# Patient Record
Sex: MALE | Race: BLACK OR AFRICAN AMERICAN | Employment: FULL TIME | URBAN - METROPOLITAN AREA
[De-identification: names, ages, dates, MRNs, and addresses within clinical notes are randomized per-mention and may not be internally consistent; named-entity substitution may affect disease eponyms.]

---

## 2019-04-25 ENCOUNTER — HOSPITAL ENCOUNTER (EMERGENCY)
Age: 49
Discharge: HOME | End: 2019-04-25
Attending: EMERGENCY MEDICINE

## 2019-04-25 VITALS
HEART RATE: 76 BPM | TEMPERATURE: 98.3 F | RESPIRATION RATE: 18 BRPM | OXYGEN SATURATION: 97 % | BODY MASS INDEX: 32.51 KG/M2 | HEIGHT: 72 IN | WEIGHT: 240 LBS | DIASTOLIC BLOOD PRESSURE: 88 MMHG | SYSTOLIC BLOOD PRESSURE: 130 MMHG

## 2019-04-25 DIAGNOSIS — A08.4 VIRAL GASTROENTERITIS: Primary | ICD-10-CM

## 2019-04-25 PROCEDURE — 99283 EMERGENCY DEPT VISIT LOW MDM: CPT

## 2019-04-25 RX ORDER — ONDANSETRON 8 MG/1
8 TABLET, ORALLY DISINTEGRATING ORAL EVERY 8 HOURS PRN
Qty: 12 TABLET | Refills: 0 | Status: SHIPPED | OUTPATIENT
Start: 2019-04-25 | End: 2020-10-14

## 2019-04-25 ASSESSMENT — PAIN DESCRIPTION - DESCRIPTORS: DESCRIPTORS: ACHING

## 2019-04-25 ASSESSMENT — PAIN SCALES - GENERAL: PAINLEVEL_OUTOF10: 4

## 2019-04-25 ASSESSMENT — PAIN DESCRIPTION - LOCATION: LOCATION: GENERALIZED

## 2019-04-25 ASSESSMENT — PAIN DESCRIPTION - PAIN TYPE: TYPE: ACUTE PAIN

## 2019-04-25 NOTE — ED PROVIDER NOTES
HPI:  4/25/19,   Time: 10:54 AM         Conrad Diaz is a 57 y.o. male presenting to the ED for diarrhea that has been ongoing since Monday. He states that he has had some accompanying nausea, but has not had any vomiting. Patient states that he is not eating, but has been able to drink some Gatorade. He has not returned to work since Monday. He is at roughly 6 bouts of diarrhea in the last day, it is described as watery.  denies any fevers, although he does complain of having some chills occasionally. Patient denies any chest pain, shortness of breath. Patient did have a sick contact at work, who has similar symptoms. ROS:   Pertinent positives and negatives are stated within HPI, all other systems reviewed and are negative.  --------------------------------------------- PAST HISTORY ---------------------------------------------  Past Medical History:  has no past medical history on file. Past Surgical History:  has a past surgical history that includes Cholecystectomy and fracture surgery. Social History:  reports that he has never smoked. He has never used smokeless tobacco. He reports that he does not drink alcohol or use drugs. Family History: family history is not on file. The patients home medications have been reviewed. Allergies: Sulfa antibiotics    -------------------------------------------------- RESULTS -------------------------------------------------  All laboratory and radiology results have been personally reviewed by myself   LABS:  No results found for this visit on 04/25/19. RADIOLOGY:  Interpreted by Radiologist.  No orders to display       ------------------------- NURSING NOTES AND VITALS REVIEWED ---------------------------   The nursing notes within the ED encounter and vital signs as below have been reviewed.    /88   Pulse 76   Temp 98.3 °F (36.8 °C)   Resp 18   Ht 6' (1.829 m)   Wt 240 lb (108.9 kg)   SpO2 97%   BMI 32.55 kg/m² Oxygen Saturation Interpretation: Normal      ---------------------------------------------------PHYSICAL EXAM--------------------------------------      Constitutional/General: Alert and oriented x3, well appearing, non toxic in NAD  Head: NC/AT  Eyes: PERRL, EOMI  Mouth: Oropharynx clear, handling secretions, no trismus  Neck: Supple, full ROM, no meningeal signs  Abdomen: Soft, non tender, non distended, normal bowel sounds  Back:  No tenderness to palpation on the cervical or thoracic or lumbar spine  Extremities: Moves all extremities x 4. Warm and well perfused  Skin: warm and dry without rash  Neurologic: GCS 15, no focal acute neurological deficit  Psych: Normal Affect      ------------------------------ ED COURSE/MEDICAL DECISION MAKING----------------------  Medications - No data to display         Medical Decision Making:    Patient presents to the [facility] emergency Department today with a case of viral gastroenteritis. He'll be given Zofran 8 mg to take 3 times a day when necessary for nausea. This should allow him to increase his solid food intake, which should allow for better formation of bowel movements. Patient was recommended to follow a bland diet. Patient advised to return to the emergency department should symptoms worsen. Advised to contact primary care physician to secure follow-up appointment within the next 1-2 days. --------------------------------- IMPRESSION AND DISPOSITION ---------------------------------    IMPRESSION  1.  Viral gastroenteritis        DISPOSITION  Disposition: Discharge to home  Patient condition is good          DO Fariha Green  04/25/19 1057

## 2019-04-25 NOTE — LETTER
[facility] [address] [place] [address] Emergency Department  [address] [facility] [address]  Phone: [phone]               April 25, 2019    Patient: Joeline Carrel   YOB: 1970   Date of Visit: 4/25/2019       To Whom It May Concern:    Jazmine Neal was seen and treated in our emergency department on 4/25/2019. He may return to work on 4/29/2019.       Sincerely,       Eddie Ordonez DO         Signature:__________________________________

## 2020-10-14 ENCOUNTER — HOSPITAL ENCOUNTER (EMERGENCY)
Age: 50
Discharge: HOME | End: 2020-10-14
Attending: STUDENT IN AN ORGANIZED HEALTH CARE EDUCATION/TRAINING PROGRAM
Payer: COMMERCIAL

## 2020-10-14 VITALS
HEIGHT: 72 IN | HEART RATE: 99 BPM | WEIGHT: 250 LBS | SYSTOLIC BLOOD PRESSURE: 145 MMHG | DIASTOLIC BLOOD PRESSURE: 95 MMHG | TEMPERATURE: 98 F | RESPIRATION RATE: 16 BRPM | OXYGEN SATURATION: 99 % | BODY MASS INDEX: 33.86 KG/M2

## 2020-10-14 LAB
BACTERIA: ABNORMAL /HPF
BILIRUBIN URINE: NEGATIVE
BLOOD, URINE: NEGATIVE
CLARITY: CLEAR
COLOR: YELLOW
EPITHELIAL CELLS, UA: ABNORMAL /HPF
GLUCOSE URINE: NEGATIVE MG/DL
KETONES, URINE: NEGATIVE MG/DL
LEUKOCYTE ESTERASE, URINE: NEGATIVE
MUCUS: PRESENT /LPF
NITRITE, URINE: NEGATIVE
PH UA: 5.5 (ref 5–9)
PROTEIN UA: NEGATIVE MG/DL
RBC UA: ABNORMAL /HPF (ref 0–2)
SPECIFIC GRAVITY UA: >=1.03 (ref 1–1.03)
UROBILINOGEN, URINE: 0.2 E.U./DL
WBC UA: ABNORMAL /HPF (ref 0–5)

## 2020-10-14 PROCEDURE — 87591 N.GONORRHOEAE DNA AMP PROB: CPT

## 2020-10-14 PROCEDURE — 81001 URINALYSIS AUTO W/SCOPE: CPT

## 2020-10-14 PROCEDURE — 87491 CHLMYD TRACH DNA AMP PROBE: CPT

## 2020-10-14 PROCEDURE — G0382 LEV 3 HOSP TYPE B ED VISIT: HCPCS

## 2020-10-14 RX ORDER — METRONIDAZOLE 500 MG/1
500 TABLET ORAL 2 TIMES DAILY
Qty: 14 TABLET | Refills: 0 | Status: SHIPPED | OUTPATIENT
Start: 2020-10-14 | End: 2020-10-21

## 2020-10-14 NOTE — ED PROVIDER NOTES
HPI:  10/14/20,   Time: 8:36 AM EDT         Ethan Hedrick is a 52 y.o. male presenting to the ED for exposure to Trichomonas. Pt reports his sexual partner had a recent gynecology exam and was noted to have trichomonas. This is the patient's only sexual partner. Pt was instructed to be evaluated for trichomonas. He denies any symptoms including penile discharge, dysuria, hematuria, abdominal pain. Does have history of chlamydia. Denies recent fever, chills, nausea, vomiting. ROS:   Pertinent positives and negatives are stated within HPI, all other systems reviewed and are negative.  --------------------------------------------- PAST HISTORY ---------------------------------------------  Past Medical History:  has no past medical history on file. Past Surgical History: Cholecystectomy    Social History:  reports that he has never smoked. He has never used smokeless tobacco. He reports that he does not drink alcohol or use drugs. Family History: family history is not on file. The patients home medications have been reviewed.     Allergies: Sulfa antibiotics    -------------------------------------------------- RESULTS -------------------------------------------------  All laboratory and radiology results have been personally reviewed by myself   LABS:  Results for orders placed or performed during the hospital encounter of 10/14/20   C.trachomatis N.gonorrhoeae DNA, Urine    Specimen: Urine   Result Value Ref Range    Source Urine    Urinalysis with Microscopic   Result Value Ref Range    Color, UA Yellow Straw/Yellow    Clarity, UA Clear Clear    Glucose, Ur Negative Negative mg/dL    Bilirubin Urine Negative Negative    Ketones, Urine Negative Negative mg/dL    Specific Gravity, UA >=1.030 1.005 - 1.030    Blood, Urine Negative Negative    pH, UA 5.5 5.0 - 9.0    Protein, UA Negative Negative mg/dL    Urobilinogen, Urine 0.2 <2.0 E.U./dL    Nitrite, Urine Negative Negative    Leukocyte Esterase, Urine Negative Negative    Mucus, UA Present (A) None Seen /LPF    WBC, UA 2-5 0 - 5 /HPF    RBC, UA 1-3 0 - 2 /HPF    Epithelial Cells, UA RARE /HPF    Bacteria, UA FEW (A) None Seen /HPF       RADIOLOGY:  Interpreted by Radiologist.  No orders to display       ------------------------- NURSING NOTES AND VITALS REVIEWED ---------------------------   The nursing notes within the ED encounter and vital signs as below have been reviewed. BP (!) 145/95   Pulse 99   Temp 98 °F (36.7 °C) (Oral)   Resp 16   Ht 6' (1.829 m)   Wt 250 lb (113.4 kg)   SpO2 99%   BMI 33.91 kg/m²   Oxygen Saturation Interpretation: Normal      ---------------------------------------------------PHYSICAL EXAM--------------------------------------    Constitutional/General: Alert and oriented x3, well appearing, non toxic in NAD  Head: NC/AT  Eyes: PERRL, EOMI  Mouth: Oropharynx clear, handling secretions, no trismus  Neck: Supple, full ROM, no meningeal signs  Pulmonary: Lungs clear to auscultation bilaterally, no wheezes, rales, or rhonchi. Not in respiratory distress  Cardiovascular:  Regular rate and rhythm, no murmurs, gallops, or rubs. 2+ distal pulses  Abdomen: Soft, non tender, non distended,   Extremities: Moves all extremities x 4. Warm and well perfused  Skin: warm and dry without rash  Neurologic: GCS 15,  Psych: Normal Affect      ------------------------------ ED COURSE/MEDICAL DECISION MAKING----------------------  Medications - No data to display    Medical Decision Makin-year-old male with exposure to trichomonas. He currently has no symptoms. Vitals reviewed and grossly within normal limits. Exam nonfocal.  We will text a urinalysis in order trichomonas screen her list would not be received today. Plan to treat the patient with 7 days of Flagyl. Patient was discharged and was given the referral line for follow-up care.     Patient's Medications   New Prescriptions    METRONIDAZOLE (FLAGYL) 500 MG TABLET Take 1 tablet by mouth 2 times daily for 7 days   Previous Medications    No medications on file   Modified Medications    No medications on file   Discontinued Medications    ONDANSETRON (ZOFRAN ODT) 8 MG TBDP DISINTEGRATING TABLET    Take 1 tablet by mouth every 8 hours as needed for Nausea         Counseling: The emergency provider has spoken with the patient and discussed todays results, in addition to providing specific details for the plan of care and counseling regarding the diagnosis and prognosis. Questions are answered at this time and they are agreeable with the plan.      --------------------------------- IMPRESSION AND DISPOSITION ---------------------------------    IMPRESSION  1.  Exposure to trichomonas        DISPOSITION  Disposition: Discharge to home  Patient condition is good                  Tyson Steward DO  10/14/20 9817

## 2020-10-19 LAB
C. TRACHOMATIS DNA ,URINE: NEGATIVE
N. GONORRHOEAE DNA, URINE: NEGATIVE
SOURCE: NORMAL

## 2021-05-04 ENCOUNTER — HOSPITAL ENCOUNTER (EMERGENCY)
Age: 51
Discharge: HOME | End: 2021-05-04
Attending: EMERGENCY MEDICINE

## 2021-05-04 VITALS
TEMPERATURE: 99 F | BODY MASS INDEX: 36.35 KG/M2 | DIASTOLIC BLOOD PRESSURE: 71 MMHG | OXYGEN SATURATION: 99 % | RESPIRATION RATE: 18 BRPM | SYSTOLIC BLOOD PRESSURE: 129 MMHG | HEART RATE: 64 BPM | WEIGHT: 268 LBS

## 2021-05-04 DIAGNOSIS — R19.7 DIARRHEA, UNSPECIFIED TYPE: Primary | ICD-10-CM

## 2021-05-04 DIAGNOSIS — Z20.2 EXPOSURE TO TRICHOMONAS: ICD-10-CM

## 2021-05-04 LAB
BACTERIA: ABNORMAL /HPF
BILIRUBIN URINE: NEGATIVE
BLOOD, URINE: NEGATIVE
CLARITY: CLEAR
COLOR: YELLOW
EPITHELIAL CELLS, UA: ABNORMAL /HPF
GLUCOSE URINE: NEGATIVE MG/DL
KETONES, URINE: NEGATIVE MG/DL
LEUKOCYTE ESTERASE, URINE: NEGATIVE
NITRITE, URINE: NEGATIVE
PH UA: 5.5 (ref 5–9)
PROTEIN UA: NEGATIVE MG/DL
RBC UA: ABNORMAL /HPF (ref 0–2)
SPECIFIC GRAVITY UA: 1.02 (ref 1–1.03)
UROBILINOGEN, URINE: 0.2 E.U./DL
WBC UA: ABNORMAL /HPF (ref 0–5)

## 2021-05-04 PROCEDURE — 99283 EMERGENCY DEPT VISIT LOW MDM: CPT

## 2021-05-04 PROCEDURE — 81001 URINALYSIS AUTO W/SCOPE: CPT

## 2021-05-04 RX ORDER — METRONIDAZOLE 500 MG/1
2000 TABLET ORAL ONCE
Qty: 4 TABLET | Refills: 0 | Status: SHIPPED | OUTPATIENT
Start: 2021-05-04 | End: 2021-05-04

## 2021-05-04 NOTE — ED PROVIDER NOTES
HPI:  5/4/21,   Time: 10:05 AM EDT         Priya Collins is a 52 y.o. male presenting to the ED for diarrhea x 4 days that is improving, denies nausea/vomiting/CP/SOB. Also claims to have been exposed to Trichomoniasis. ROS:   Pertinent positives and negatives are stated within HPI, all other systems reviewed and are negative.  --------------------------------------------- PAST HISTORY ---------------------------------------------  Past Medical History:  has no past medical history on file. Past Surgical History:  has a past surgical history that includes Cholecystectomy and fracture surgery. Social History:  reports that he has never smoked. He has never used smokeless tobacco. He reports that he does not drink alcohol or use drugs. Family History: family history is not on file. The patients home medications have been reviewed.     Allergies: Sulfa antibiotics    -------------------------------------------------- RESULTS -------------------------------------------------  All laboratory and radiology results have been personally reviewed by myself   LABS:  Results for orders placed or performed during the hospital encounter of 05/04/21   Urinalysis   Result Value Ref Range    Color, UA Yellow Straw/Yellow    Clarity, UA Clear Clear    Glucose, Ur Negative Negative mg/dL    Bilirubin Urine Negative Negative    Ketones, Urine Negative Negative mg/dL    Specific Gravity, UA 1.020 1.005 - 1.030    Blood, Urine Negative Negative    pH, UA 5.5 5.0 - 9.0    Protein, UA Negative Negative mg/dL    Urobilinogen, Urine 0.2 <2.0 E.U./dL    Nitrite, Urine Negative Negative    Leukocyte Esterase, Urine Negative Negative   Microscopic Urinalysis   Result Value Ref Range    WBC, UA 0-1 0 - 5 /HPF    RBC, UA 0-1 0 - 2 /HPF    Epithelial Cells, UA RARE /HPF    Bacteria, UA RARE (A) None Seen /HPF       RADIOLOGY:  Interpreted by Radiologist.  No orders to display       ------------------------- NURSING NOTES AND

## 2022-01-11 ENCOUNTER — HOSPITAL ENCOUNTER (EMERGENCY)
Age: 52
Discharge: HOME | End: 2022-01-11
Attending: EMERGENCY MEDICINE

## 2022-01-11 VITALS
OXYGEN SATURATION: 99 % | DIASTOLIC BLOOD PRESSURE: 82 MMHG | HEIGHT: 72 IN | RESPIRATION RATE: 18 BRPM | TEMPERATURE: 98.2 F | HEART RATE: 88 BPM | WEIGHT: 230 LBS | SYSTOLIC BLOOD PRESSURE: 143 MMHG | BODY MASS INDEX: 31.15 KG/M2

## 2022-01-11 DIAGNOSIS — Z20.822 SUSPECTED COVID-19 VIRUS INFECTION: Primary | ICD-10-CM

## 2022-01-11 PROCEDURE — 99283 EMERGENCY DEPT VISIT LOW MDM: CPT

## 2022-01-11 PROCEDURE — U0005 INFEC AGEN DETEC AMPLI PROBE: HCPCS

## 2022-01-11 PROCEDURE — U0003 INFECTIOUS AGENT DETECTION BY NUCLEIC ACID (DNA OR RNA); SEVERE ACUTE RESPIRATORY SYNDROME CORONAVIRUS 2 (SARS-COV-2) (CORONAVIRUS DISEASE [COVID-19]), AMPLIFIED PROBE TECHNIQUE, MAKING USE OF HIGH THROUGHPUT TECHNOLOGIES AS DESCRIBED BY CMS-2020-01-R: HCPCS

## 2022-01-11 RX ORDER — BROMPHENIRAMINE MALEATE, PSEUDOEPHEDRINE HYDROCHLORIDE, AND DEXTROMETHORPHAN HYDROBROMIDE 2; 30; 10 MG/5ML; MG/5ML; MG/5ML
5 SYRUP ORAL 4 TIMES DAILY PRN
Qty: 120 ML | Refills: 0 | Status: SHIPPED | OUTPATIENT
Start: 2022-01-11 | End: 2022-01-21

## 2022-01-11 RX ORDER — BENZONATATE 200 MG/1
200 CAPSULE ORAL 3 TIMES DAILY PRN
Qty: 30 CAPSULE | Refills: 0 | Status: SHIPPED | OUTPATIENT
Start: 2022-01-11 | End: 2022-01-18

## 2022-01-11 RX ORDER — ALBUTEROL SULFATE 90 UG/1
2 AEROSOL, METERED RESPIRATORY (INHALATION) 4 TIMES DAILY PRN
Qty: 18 G | Refills: 0 | Status: SHIPPED | OUTPATIENT
Start: 2022-01-11 | End: 2022-09-08

## 2022-01-11 ASSESSMENT — PAIN - FUNCTIONAL ASSESSMENT: PAIN_FUNCTIONAL_ASSESSMENT: 0-10

## 2022-01-11 ASSESSMENT — ENCOUNTER SYMPTOMS
EYE DISCHARGE: 0
EYE REDNESS: 0
SINUS PRESSURE: 0
COUGH: 1
WHEEZING: 0
DIARRHEA: 0
ABDOMINAL PAIN: 0
SHORTNESS OF BREATH: 0
EYE PAIN: 0
VOMITING: 0
SORE THROAT: 0
BACK PAIN: 0
NAUSEA: 0

## 2022-01-11 ASSESSMENT — PAIN DESCRIPTION - DESCRIPTORS: DESCRIPTORS: ACHING

## 2022-01-11 ASSESSMENT — PAIN DESCRIPTION - PROGRESSION: CLINICAL_PROGRESSION: NOT CHANGED

## 2022-01-11 ASSESSMENT — PAIN DESCRIPTION - PAIN TYPE: TYPE: ACUTE PAIN

## 2022-01-11 ASSESSMENT — PAIN SCALES - GENERAL
PAINLEVEL_OUTOF10: 4
PAINLEVEL_OUTOF10: 4

## 2022-01-11 ASSESSMENT — PAIN DESCRIPTION - FREQUENCY: FREQUENCY: CONTINUOUS

## 2022-01-11 ASSESSMENT — PAIN DESCRIPTION - LOCATION: LOCATION: GENERALIZED

## 2022-01-11 ASSESSMENT — PAIN DESCRIPTION - ONSET: ONSET: ON-GOING

## 2022-01-11 NOTE — Clinical Note
Braden Coronado was seen and treated in our emergency department on 1/11/2022. He may return to work on 01/15/2022. REMAIN IN ISOLATION (Pending results):  NEGATIVE result:                                                                                                                                                                                     (result in 2-3 days; return to work/school if negative)       OR     POSITIVE result:  \" 5 days from date symptoms first appeared, mask for 10 days  \" at least 24 hours with no fever (without the use of fever-reducing medication)   \" AND symptoms improved         If you have any questions or concerns, please don't hesitate to call.       Melida Hussein, DO

## 2022-01-11 NOTE — ED PROVIDER NOTES
The history is provided by the patient. Illness   The current episode started 3 to 5 days ago. The onset was gradual. The problem occurs occasionally. The problem has been unchanged. The problem is mild. Nothing relieves the symptoms. Nothing aggravates the symptoms. Associated symptoms include congestion, cough and URI. Pertinent negatives include no fever, no abdominal pain, no diarrhea, no nausea, no vomiting, no ear pain, no headaches, no sore throat, no wheezing, no rash, no eye discharge, no eye pain and no eye redness. He has been less active. He has been eating less than usual. Urine output has been normal. The last void occurred less than 6 hours ago. There were sick contacts at home. He has received no recent medical care. Review of Systems   Constitutional: Positive for activity change, appetite change, chills and fatigue. Negative for fever. HENT: Positive for congestion. Negative for ear pain, sinus pressure and sore throat. Eyes: Negative for pain, discharge and redness. Respiratory: Positive for cough. Negative for shortness of breath and wheezing. Cardiovascular: Negative for chest pain. Gastrointestinal: Negative for abdominal pain, diarrhea, nausea and vomiting. Genitourinary: Negative for dysuria and frequency. Musculoskeletal: Positive for myalgias. Negative for arthralgias and back pain. Skin: Negative for rash and wound. Neurological: Negative for weakness and headaches. Hematological: Negative for adenopathy. Psychiatric/Behavioral: Negative. All other systems reviewed and are negative. Physical Exam  Vitals and nursing note reviewed. Constitutional:       Appearance: He is well-developed. HENT:      Head: Normocephalic and atraumatic. Right Ear: Tympanic membrane normal.      Left Ear: Tympanic membrane normal.      Nose: Congestion and rhinorrhea present. Mouth/Throat:      Pharynx: Posterior oropharyngeal erythema present.    Eyes: Pupils: Pupils are equal, round, and reactive to light. Cardiovascular:      Rate and Rhythm: Normal rate and regular rhythm. Heart sounds: Normal heart sounds. No murmur heard. Pulmonary:      Effort: Pulmonary effort is normal.      Breath sounds: Wheezing present. Abdominal:      General: Bowel sounds are normal.      Palpations: Abdomen is soft. Tenderness: There is no abdominal tenderness. There is no guarding or rebound. Musculoskeletal:      Cervical back: Normal range of motion and neck supple. Skin:     General: Skin is warm and dry. Neurological:      Mental Status: He is alert and oriented to person, place, and time. Psychiatric:         Behavior: Behavior normal.         Thought Content: Thought content normal.         Judgment: Judgment normal.        --------------------------------------------- PAST HISTORY ---------------------------------------------  Past Medical History:  has no past medical history on file. Past Surgical History:  has a past surgical history that includes Cholecystectomy and fracture surgery. Social History:  reports that he has never smoked. He has never used smokeless tobacco. He reports that he does not drink alcohol and does not use drugs. Family History: family history is not on file. The patients home medications have been reviewed. Allergies: Sulfa antibiotics    -------------------------------------------------- RESULTS -------------------------------------------------  No results found for this visit on 01/11/22. No orders to display       ------------------------- NURSING NOTES AND VITALS REVIEWED ---------------------------   The nursing notes within the ED encounter and vital signs as below have been reviewed.    BP (!) 143/82   Pulse 88   Temp 98.2 °F (36.8 °C) (Temporal)   Resp 18   Ht 6' (1.829 m)   Wt 230 lb (104.3 kg)   SpO2 99%   BMI 31.19 kg/m²   Oxygen Saturation Interpretation:

## 2022-01-12 ENCOUNTER — CARE COORDINATION (OUTPATIENT)
Dept: CARE COORDINATION | Age: 52
End: 2022-01-12

## 2022-01-12 LAB — SARS-COV-2, PCR: DETECTED

## 2022-01-12 NOTE — CARE COORDINATION
Patient contacted regarding COVID-19 diagnosis. Discussed COVID-19 related testing which was available at this time. Test results were positive. Patient informed of results, if available? Yes. Ambulatory Care Manager contacted the patient by telephone to perform post discharge assessment. Call within 2 business days of discharge: Yes. Verified name and  with patient as identifiers. Provided introduction to self, and explanation of the CTN/ACM role, and reason for call due to risk factors for infection and/or exposure to COVID-19. Symptoms reviewed with patient who verbalized the following symptoms: fatigue, cough, sweating, no new symptoms, no worsening symptoms and decreased appetite. Due to no new or worsening symptoms encounter was not routed to provider for escalation. Discussed follow-up appointments. If no appointment was previously scheduled, appointment scheduling offered: Yes. [facility] follow up appointment(s): No future appointments. Referral made for Mitchell to be contacted to establish with a [facility] PCP. Non-[facility] follow up appointment(s): NO PCP    Non-face-to-face services provided:  Obtained and reviewed discharge summary and/or continuity of care documents     Advance Care Planning:   Does patient have an Advance Directive:  decision maker updated. Educated patient about risk for severe COVID-19 due to risk factors according to CDC guidelines. ACM reviewed discharge instructions, medical action plan and red flag symptoms with the patient who verbalized understanding. Discussed COVID vaccination status: Yes. Education provided on COVID-19 vaccination as appropriate. Discussed exposure protocols and quarantine with CDC Guidelines. Patient was given an opportunity to verbalize any questions and concerns and agrees to contact ACM or health care provider for questions related to their healthcare.     Reviewed and educated patient on any new and changed medications related to discharge diagnosis     Was patient discharged with a pulse oximeter? Hortencia Medrano denies any fever or shortness of breath. He reports he is taking the benzonatate, albuterol inhaler and brompheniraminepseudoephedrine-DM as prescribed. He is active with MyChart. ACM provided contact information. No further follow-up call identified based on severity of symptoms and risk factors.

## 2022-01-12 NOTE — CARE COORDINATION
Date/Time:  1/12/2022 4:02 PM  Attempted to reach patient by telephone. Left HIPAA compliant message requesting a return call. Will attempt to reach patient again.

## 2022-09-08 ENCOUNTER — OFFICE VISIT (OUTPATIENT)
Dept: FAMILY MEDICINE CLINIC | Age: 52
End: 2022-09-08
Payer: MEDICAID

## 2022-09-08 VITALS
OXYGEN SATURATION: 97 % | BODY MASS INDEX: 34.81 KG/M2 | HEART RATE: 71 BPM | TEMPERATURE: 98.2 F | RESPIRATION RATE: 16 BRPM | WEIGHT: 257 LBS | DIASTOLIC BLOOD PRESSURE: 72 MMHG | HEIGHT: 72 IN | SYSTOLIC BLOOD PRESSURE: 116 MMHG

## 2022-09-08 DIAGNOSIS — Z12.11 SCREEN FOR COLON CANCER: ICD-10-CM

## 2022-09-08 DIAGNOSIS — K21.9 GASTROESOPHAGEAL REFLUX DISEASE, UNSPECIFIED WHETHER ESOPHAGITIS PRESENT: Primary | ICD-10-CM

## 2022-09-08 DIAGNOSIS — Z13.220 SCREENING, LIPID: ICD-10-CM

## 2022-09-08 DIAGNOSIS — Z13.1 DIABETES MELLITUS SCREENING: ICD-10-CM

## 2022-09-08 LAB — HBA1C MFR BLD: 5.8 %

## 2022-09-08 PROCEDURE — 83036 HEMOGLOBIN GLYCOSYLATED A1C: CPT | Performed by: FAMILY MEDICINE

## 2022-09-08 PROCEDURE — 99204 OFFICE O/P NEW MOD 45 MIN: CPT | Performed by: FAMILY MEDICINE

## 2022-09-08 PROCEDURE — 3017F COLORECTAL CA SCREEN DOC REV: CPT | Performed by: FAMILY MEDICINE

## 2022-09-08 PROCEDURE — G8417 CALC BMI ABV UP PARAM F/U: HCPCS | Performed by: FAMILY MEDICINE

## 2022-09-08 PROCEDURE — G8427 DOCREV CUR MEDS BY ELIG CLIN: HCPCS | Performed by: FAMILY MEDICINE

## 2022-09-08 PROCEDURE — 1036F TOBACCO NON-USER: CPT | Performed by: FAMILY MEDICINE

## 2022-09-08 RX ORDER — PANTOPRAZOLE SODIUM 40 MG/1
40 TABLET, DELAYED RELEASE ORAL DAILY
Qty: 30 TABLET | Refills: 1 | Status: SHIPPED
Start: 2022-09-08 | End: 2022-09-16

## 2022-09-08 SDOH — ECONOMIC STABILITY: FOOD INSECURITY: WITHIN THE PAST 12 MONTHS, YOU WORRIED THAT YOUR FOOD WOULD RUN OUT BEFORE YOU GOT MONEY TO BUY MORE.: NEVER TRUE

## 2022-09-08 SDOH — ECONOMIC STABILITY: FOOD INSECURITY: WITHIN THE PAST 12 MONTHS, THE FOOD YOU BOUGHT JUST DIDN'T LAST AND YOU DIDN'T HAVE MONEY TO GET MORE.: NEVER TRUE

## 2022-09-08 ASSESSMENT — PATIENT HEALTH QUESTIONNAIRE - PHQ9
2. FEELING DOWN, DEPRESSED OR HOPELESS: 0
SUM OF ALL RESPONSES TO PHQ QUESTIONS 1-9: 0
SUM OF ALL RESPONSES TO PHQ9 QUESTIONS 1 & 2: 0
SUM OF ALL RESPONSES TO PHQ QUESTIONS 1-9: 0
1. LITTLE INTEREST OR PLEASURE IN DOING THINGS: 0

## 2022-09-08 ASSESSMENT — SOCIAL DETERMINANTS OF HEALTH (SDOH): HOW HARD IS IT FOR YOU TO PAY FOR THE VERY BASICS LIKE FOOD, HOUSING, MEDICAL CARE, AND HEATING?: NOT HARD AT ALL

## 2022-09-08 ASSESSMENT — LIFESTYLE VARIABLES
HOW OFTEN DO YOU HAVE A DRINK CONTAINING ALCOHOL: NEVER
HOW MANY STANDARD DRINKS CONTAINING ALCOHOL DO YOU HAVE ON A TYPICAL DAY: PATIENT DOES NOT DRINK

## 2022-09-08 NOTE — PROGRESS NOTES
2022    Xin Sandoval    Chief Complaint   Patient presents with    Establish Care     Patient here to establish. Patient just turned 50 and wants a physical.     Heartburn     Has been experiencing heartburn recently. Would like this checked out. HPI  History was obtained from patient. Cyn Braswell is a 50 y.o. male who presents today with the followin. Diabetes mellitus screening          REVIEW OF SYMPTOMS    Review of Systems    PAST MEDICAL HISTORY  No past medical history on file. FAMILY HISTORY  No family history on file. SOCIAL HISTORY  Social History     Socioeconomic History    Marital status:      Spouse name: None    Number of children: None    Years of education: None    Highest education level: None   Tobacco Use    Smoking status: Never    Smokeless tobacco: Never   Substance and Sexual Activity    Alcohol use: No    Drug use: Never     Social Determinants of Health     Financial Resource Strain: Low Risk     Difficulty of Paying Living Expenses: Not hard at all   Food Insecurity: No Food Insecurity    Worried About Running Out of Food in the Last Year: Never true    Ran Out of Food in the Last Year: Never true        SURGICAL HISTORY  Past Surgical History:   Procedure Laterality Date    CHOLECYSTECTOMY      FRACTURE SURGERY                   CURRENT MEDICATIONS  No current outpatient medications on file. No current facility-administered medications for this visit. ALLERGIES  Allergies   Allergen Reactions    Sulfa Antibiotics Rash       PHYSICAL EXAM    /72   Pulse 71   Temp 98.2 °F (36.8 °C)   Resp 16   Ht 6' (1.829 m)   Wt 257 lb (116.6 kg)   SpO2 97%   BMI 34.86 kg/m²     Physical Exam    ASSESSMENT & PLAN    Cyn Braswell was seen today for establish care and heartburn. Diagnoses and all orders for this visit:    Diabetes mellitus screening  -     POCT glycosylated hemoglobin (Hb A1C)        No follow-ups on file.          Electronically signed by Anni Mcfarland.  Sb Patel, DO on 9/8/2022

## 2022-09-10 ENCOUNTER — HOSPITAL ENCOUNTER (OUTPATIENT)
Age: 52
Discharge: HOME | End: 2022-09-10
Payer: MEDICAID

## 2022-09-10 DIAGNOSIS — Z13.220 SCREENING, LIPID: ICD-10-CM

## 2022-09-10 DIAGNOSIS — Z13.1 DIABETES MELLITUS SCREENING: ICD-10-CM

## 2022-09-10 LAB
ALBUMIN SERPL-MCNC: 4.5 G/DL (ref 3.5–5.2)
ALP BLD-CCNC: 51 U/L (ref 40–129)
ALT SERPL-CCNC: 13 U/L (ref 0–40)
ANION GAP SERPL CALCULATED.3IONS-SCNC: 12 MMOL/L (ref 7–16)
AST SERPL-CCNC: 20 U/L (ref 0–39)
BILIRUB SERPL-MCNC: 0.4 MG/DL (ref 0–1.2)
BUN BLDV-MCNC: 13 MG/DL (ref 6–20)
CALCIUM SERPL-MCNC: 9.2 MG/DL (ref 8.6–10.2)
CHLORIDE BLD-SCNC: 102 MMOL/L (ref 98–107)
CHOLESTEROL, TOTAL: 178 MG/DL (ref 0–199)
CO2: 24 MMOL/L (ref 22–29)
CREAT SERPL-MCNC: 1 MG/DL (ref 0.7–1.2)
GFR AFRICAN AMERICAN: >60
GFR NON-AFRICAN AMERICAN: >60 ML/MIN/1.73
GLUCOSE BLD-MCNC: 114 MG/DL (ref 74–99)
HDLC SERPL-MCNC: 44 MG/DL
LDL CHOLESTEROL CALCULATED: 115 MG/DL (ref 0–99)
POTASSIUM SERPL-SCNC: 4.1 MMOL/L (ref 3.5–5)
SODIUM BLD-SCNC: 138 MMOL/L (ref 132–146)
TOTAL PROTEIN: 7.4 G/DL (ref 6.4–8.3)
TRIGL SERPL-MCNC: 94 MG/DL (ref 0–149)
VLDLC SERPL CALC-MCNC: 19 MG/DL

## 2022-09-10 PROCEDURE — 80061 LIPID PANEL: CPT

## 2022-09-10 PROCEDURE — 80053 COMPREHEN METABOLIC PANEL: CPT

## 2022-09-10 PROCEDURE — 36415 COLL VENOUS BLD VENIPUNCTURE: CPT

## 2022-09-11 ASSESSMENT — ENCOUNTER SYMPTOMS
COUGH: 0
EYE DISCHARGE: 0
SINUS PAIN: 0
SHORTNESS OF BREATH: 0
RHINORRHEA: 0
PHOTOPHOBIA: 0
EYE REDNESS: 0
ABDOMINAL PAIN: 1

## 2022-09-11 NOTE — PROGRESS NOTES
2022    Indira Rodriguez    Chief Complaint   Patient presents with    [facility] Care     Patient here to establish. Patient just turned 50 and wants a physical.     Heartburn     Has been experiencing heartburn recently. Would like this checked out. HPI  History was obtained from patient. Dano Rodriguez is a 50 y.o. male who presents today with the followin. Gastroesophageal reflux disease, unspecified whether esophagitis present    2. Diabetes mellitus screening    3. Screening, lipid    4. Screen for colon cancer    Patient presents to [facility]. Patient is not currently taking any prescription medication. Patient does have a complaint of epigastric pain. He states he has this on a daily basis and wants to have it checked out. Patient states that the pain is not changed by eating. He is not currently on any medication for this. He denies any constipation or diarrhea. REVIEW OF SYMPTOMS    Review of Systems   Constitutional:  Negative for chills, fatigue and fever. HENT:  Negative for congestion, mouth sores, postnasal drip, rhinorrhea and sinus pain. Eyes:  Negative for photophobia, discharge and redness. Respiratory:  Negative for cough and shortness of breath. Cardiovascular:  Negative for chest pain. Gastrointestinal:  Positive for abdominal pain (Epigastric). Genitourinary:  Negative for difficulty urinating, dysuria, hematuria and urgency. Neurological:  Negative for headaches. Psychiatric/Behavioral:  Negative for sleep disturbance. PAST MEDICAL HISTORY  History reviewed. No pertinent past medical history. FAMILY HISTORY  Family History   Problem Relation Age of Onset    Diabetes Mother     Heart Failure Mother     Diabetes Father         kidney transplant       SOCIAL HISTORY  Social History     Socioeconomic History    Marital status:       Spouse name: None    Number of children: None    Years of education: None    Highest education level: None   Tobacco Use    Smoking status: Never    Smokeless tobacco: Never   Substance and Sexual Activity    Alcohol use: No    Drug use: Never     Social Determinants of Health     Financial Resource Strain: Low Risk     Difficulty of Paying Living Expenses: Not hard at all   Food Insecurity: No Food Insecurity    Worried About [facility] in the Last Year: Never true    [facility] in the Last Year: Never true        SURGICAL HISTORY  Past Surgical History:   Procedure Laterality Date    CHOLECYSTECTOMY  2000    FRACTURE SURGERY                   CURRENT MEDICATIONS  Current Outpatient Medications   Medication Sig Dispense Refill    pantoprazole (PROTONIX) 40 MG tablet Take 1 tablet by mouth daily 30 tablet 1     No current facility-administered medications for this visit. ALLERGIES  Allergies   Allergen Reactions    Sulfa Antibiotics Rash       PHYSICAL EXAM    /72   Pulse 71   Temp 98.2 °F (36.8 °C)   Resp 16   Ht 6' (1.829 m)   Wt 257 lb (116.6 kg)   SpO2 97%   BMI 34.86 kg/m²     Physical Exam  Vitals and nursing note reviewed. Constitutional:       Appearance: Normal appearance. HENT:      Right Ear: Tympanic membrane and ear canal normal.      Left Ear: Tympanic membrane and ear canal normal.      Nose: No congestion or rhinorrhea. Mouth/Throat:      Mouth: Mucous membranes are moist.      Pharynx: Oropharynx is clear. Eyes:      General: No scleral icterus. Conjunctiva/sclera: Conjunctivae normal.   Cardiovascular:      Rate and Rhythm: Normal rate and regular rhythm. Heart sounds: Normal heart sounds. Pulmonary:      Effort: Pulmonary effort is normal.      Breath sounds: Normal breath sounds. Abdominal:      Palpations: Abdomen is soft. Tenderness: There is abdominal tenderness (Mild epigastric). Musculoskeletal:      Cervical back: Neck supple. Skin:     General: Skin is warm.    Neurological:      Mental Status: He is alert and oriented to

## 2022-09-16 ENCOUNTER — HOSPITAL ENCOUNTER (EMERGENCY)
Age: 52
Discharge: HOME | End: 2022-09-16
Attending: STUDENT IN AN ORGANIZED HEALTH CARE EDUCATION/TRAINING PROGRAM
Payer: MEDICAID

## 2022-09-16 VITALS
DIASTOLIC BLOOD PRESSURE: 75 MMHG | TEMPERATURE: 96.9 F | SYSTOLIC BLOOD PRESSURE: 130 MMHG | HEART RATE: 67 BPM | RESPIRATION RATE: 16 BRPM | HEIGHT: 72 IN | OXYGEN SATURATION: 98 % | WEIGHT: 250 LBS | BODY MASS INDEX: 33.86 KG/M2

## 2022-09-16 DIAGNOSIS — S39.012A STRAIN OF LUMBAR REGION, INITIAL ENCOUNTER: Primary | ICD-10-CM

## 2022-09-16 PROCEDURE — 96372 THER/PROPH/DIAG INJ SC/IM: CPT

## 2022-09-16 PROCEDURE — 6360000002 HC RX W HCPCS: Performed by: STUDENT IN AN ORGANIZED HEALTH CARE EDUCATION/TRAINING PROGRAM

## 2022-09-16 PROCEDURE — 99284 EMERGENCY DEPT VISIT MOD MDM: CPT

## 2022-09-16 RX ORDER — IBUPROFEN 800 MG/1
800 TABLET ORAL 2 TIMES DAILY PRN
Qty: 30 TABLET | Refills: 0 | Status: SHIPPED | OUTPATIENT
Start: 2022-09-16

## 2022-09-16 RX ORDER — CYCLOBENZAPRINE HCL 5 MG
5 TABLET ORAL 2 TIMES DAILY PRN
Qty: 10 TABLET | Refills: 0 | Status: SHIPPED | OUTPATIENT
Start: 2022-09-16 | End: 2022-09-26

## 2022-09-16 RX ORDER — ORPHENADRINE CITRATE 30 MG/ML
60 INJECTION INTRAMUSCULAR; INTRAVENOUS ONCE
Status: COMPLETED | OUTPATIENT
Start: 2022-09-16 | End: 2022-09-16

## 2022-09-16 RX ORDER — KETOROLAC TROMETHAMINE 30 MG/ML
30 INJECTION, SOLUTION INTRAMUSCULAR; INTRAVENOUS ONCE
Status: COMPLETED | OUTPATIENT
Start: 2022-09-16 | End: 2022-09-16

## 2022-09-16 RX ADMIN — KETOROLAC TROMETHAMINE 30 MG: 30 INJECTION, SOLUTION INTRAMUSCULAR at 08:38

## 2022-09-16 RX ADMIN — ORPHENADRINE CITRATE 60 MG: 30 INJECTION INTRAMUSCULAR; INTRAVENOUS at 08:38

## 2022-09-16 ASSESSMENT — ENCOUNTER SYMPTOMS
DIARRHEA: 0
COUGH: 0
SORE THROAT: 0
VOMITING: 0
SINUS PRESSURE: 0
EYE REDNESS: 0
EYE PAIN: 0
EYE DISCHARGE: 0
WHEEZING: 0
ABDOMINAL PAIN: 0
BACK PAIN: 1
NAUSEA: 0
SHORTNESS OF BREATH: 0

## 2022-09-16 ASSESSMENT — PAIN SCALES - GENERAL: PAINLEVEL_OUTOF10: 8

## 2022-09-16 NOTE — Clinical Note
Anton Whyte was seen and treated in our emergency department on 9/16/2022. He may return to work on 09/19/2022. If you have any questions or concerns, please don't hesitate to call.       Dinora Jimenez, DO

## 2022-09-16 NOTE — ED PROVIDER NOTES
HPI   Patient here for evaluation of right lower back pain started yesterday after helping his daughter move a washer and dryer upstairs. Did not feel any specific pops. He states he was having pain with laying down and attempting to sleep last night. Patient was unable to go to work today due to pain as he has to lift heavy objects at work. He took 400 mg ibuprofen last night with no significant improvement. Denies any numbness tingling or weakness in his lower extremities. No bowel or bladder incontinence. No fevers. States he is able to ambulate without difficulty. No dysuria or hematuria. He reports pain radiates to buttock area on the right side. Review of Systems   Constitutional:  Negative for chills and fever. HENT:  Negative for ear pain, sinus pressure and sore throat. Eyes:  Negative for pain, discharge and redness. Respiratory:  Negative for cough, shortness of breath and wheezing. Cardiovascular:  Negative for chest pain. Gastrointestinal:  Negative for abdominal pain, diarrhea, nausea and vomiting. Genitourinary:  Negative for dysuria and frequency. Musculoskeletal:  Positive for back pain. Negative for arthralgias. Skin:  Negative for rash and wound. Neurological:  Negative for weakness and headaches. Hematological:  Negative for adenopathy. All other systems reviewed and are negative. Physical Exam  Vitals and nursing note reviewed. Constitutional:       Appearance: He is well-developed. HENT:      Head: Normocephalic and atraumatic. Eyes:      Conjunctiva/sclera: Conjunctivae normal.   Cardiovascular:      Rate and Rhythm: Normal rate and regular rhythm. Heart sounds: Normal heart sounds. No murmur heard. Pulmonary:      Effort: Pulmonary effort is normal. No respiratory distress. Breath sounds: Normal breath sounds. No wheezing or rales. Abdominal:      General: Bowel sounds are normal.      Palpations: Abdomen is soft.       Tenderness: There is no abdominal tenderness. There is no guarding or rebound. Musculoskeletal:         General: No tenderness or deformity. Cervical back: Normal range of motion and neck supple. Comments: Tenderness to palpation of the right lumbar paraspinal musculature. No step-offs or crepitus in the midline. Full range of motion of the lower extremities. Skin:     General: Skin is warm and dry. Capillary Refill: Capillary refill takes less than 2 seconds. Neurological:      Mental Status: He is alert and oriented to person, place, and time. Cranial Nerves: No cranial nerve deficit. Coordination: Coordination normal.      Comments: Strength is 5 out of 5 in bilateral lower extremities. Procedures     MDM  Patient here for evaluation of back pain symptoms consistent with strain after heavy lifting . Patient without any concerning symptoms of cauda equina or spinal epidural abscess. At this time we will treat symptomatically with Toradol and Norflex. He was given ibuprofen and Flexeril to go home with recommended to use heating pad as well. Patient was provided with a work note for a few days to allow him to rest.               --------------------------------------------- PAST HISTORY ---------------------------------------------  Past Medical History:  has no past medical history on file. Past Surgical History:  has a past surgical history that includes Cholecystectomy (2000) and fracture surgery. Social History:  reports that he has never smoked. He has never used smokeless tobacco. He reports that he does not drink alcohol and does not use drugs. Family History: family history includes Diabetes in his father and mother; Heart Failure in his mother. The patients home medications have been reviewed.     Allergies: Sulfa antibiotics    -------------------------------------------------- RESULTS -------------------------------------------------  Labs:  No results found for this visit on 09/16/22. Radiology:  No orders to display       ------------------------- NURSING NOTES AND VITALS REVIEWED ---------------------------  Date / Time Roomed:  9/16/2022  8:21 AM  ED Bed Assignment:  02/02    The nursing notes within the ED encounter and vital signs as below have been reviewed. /75   Pulse 67   Temp 96.9 °F (36.1 °C) (Temporal)   Resp 16   Ht 6' (1.829 m)   Wt 250 lb (113.4 kg)   SpO2 98%   BMI 33.91 kg/m²   Oxygen Saturation Interpretation: Normal  --------------------------------- ADDITIONAL PROVIDER NOTES ---------------------------------  At this time the patient is without objective evidence of an acute process requiring hospitalization or inpatient management. They have remained hemodynamically stable throughout their entire ED visit and are stable for discharge with outpatient follow-up. The plan has been discussed in detail and they are aware of the specific conditions for emergent return, as well as the importance of follow-up. New Prescriptions    CYCLOBENZAPRINE (FLEXERIL) 5 MG TABLET    Take 1 tablet by mouth 2 times daily as needed for Muscle spasms    IBUPROFEN (ADVIL;MOTRIN) 800 MG TABLET    Take 1 tablet by mouth 2 times daily as needed for Pain       Diagnosis:  1. Strain of lumbar region, initial encounter        Disposition:  Patient's disposition: Discharge to home  Patient's condition is stable.        Maxime Chance DO  Resident  09/16/22 [id number]

## 2022-10-06 ENCOUNTER — TELEPHONE (OUTPATIENT)
Dept: FAMILY MEDICINE CLINIC | Age: 52
End: 2022-10-06

## 2022-10-06 NOTE — LETTER
[facility]  [address] [address] [place] [id number]  Phone: [phone]  Fax: [phone]    Kristen Tinsley Current, DO        October 6, 2022     [address] [place] [address] [address]      Dear Tori Gooden: We missed seeing you for a scheduled appointment at [facility] with Kristen Tinsley Current, DO on 10/6/2022. We're sorry you were unable to keep your appointment and hope that you are doing well. We ask that you please call 24 hours in advance if you are unable to make your appointment, so that we can give that time to another patient in need. We care about you and the management of your healthcare and want to make sure that you follow up as recommended. To provide quality care and timely appointments to all our patients, you may be dismissed from the practice if you do not show for three (3) scheduled appointments within a 12-month period. We would like to continue treating your healthcare needs. Please call the office to reschedule your appointment, if needed. Sincerely,        Kristen Lam

## 2022-12-05 ENCOUNTER — HOSPITAL ENCOUNTER (EMERGENCY)
Age: 52
Discharge: HOME | End: 2022-12-05
Attending: FAMILY MEDICINE
Payer: MEDICAID

## 2022-12-05 VITALS
BODY MASS INDEX: 33.86 KG/M2 | OXYGEN SATURATION: 100 % | RESPIRATION RATE: 16 BRPM | DIASTOLIC BLOOD PRESSURE: 91 MMHG | TEMPERATURE: 97.1 F | HEIGHT: 72 IN | HEART RATE: 65 BPM | SYSTOLIC BLOOD PRESSURE: 133 MMHG | WEIGHT: 250 LBS

## 2022-12-05 DIAGNOSIS — B34.9 VIRAL SYNDROME: Primary | ICD-10-CM

## 2022-12-05 LAB
INFLUENZA A BY PCR: NOT DETECTED
INFLUENZA B BY PCR: NOT DETECTED
STREP GRP A PCR: NEGATIVE

## 2022-12-05 PROCEDURE — 99283 EMERGENCY DEPT VISIT LOW MDM: CPT

## 2022-12-05 PROCEDURE — 87880 STREP A ASSAY W/OPTIC: CPT

## 2022-12-05 PROCEDURE — 87502 INFLUENZA DNA AMP PROBE: CPT

## 2022-12-05 RX ORDER — ECHINACEA PURPUREA EXTRACT 125 MG
1 TABLET ORAL PRN
Qty: 88 ML | Refills: 0 | Status: SHIPPED | OUTPATIENT
Start: 2022-12-05

## 2022-12-05 RX ORDER — ONDANSETRON 4 MG/1
4 TABLET, FILM COATED ORAL EVERY 4 HOURS
Qty: 5 TABLET | Refills: 0 | Status: SHIPPED | OUTPATIENT
Start: 2022-12-05

## 2022-12-05 RX ORDER — NAPROXEN 500 MG/1
500 TABLET ORAL 2 TIMES DAILY
Qty: 20 TABLET | Refills: 0 | Status: SHIPPED | OUTPATIENT
Start: 2022-12-05 | End: 2022-12-15

## 2022-12-05 RX ORDER — BROMPHENIRAMINE MALEATE, PSEUDOEPHEDRINE HYDROCHLORIDE, AND DEXTROMETHORPHAN HYDROBROMIDE 2; 30; 10 MG/5ML; MG/5ML; MG/5ML
5 SYRUP ORAL 4 TIMES DAILY PRN
Qty: 90 ML | Refills: 0 | Status: SHIPPED | OUTPATIENT
Start: 2022-12-05

## 2022-12-05 NOTE — ED PROVIDER NOTES
HPI:  12/5/22,   Time: 9:41 AM CATHIE Castillo is a 50 y.o. male presenting to the ED for upper respiratory symptoms that started yesterday including a sore throat, nausea and vomiting that occurred yesterday, none today, fatigue, and an episode of chills and body aches as well. He has been exposed to sick contacts at home both with his wife and his children. He did a home COVID test yesterday that was negative. He works in environment where he is in and out of the cold environment. ROS:   Pertinent positives and negatives are stated within HPI, all other systems reviewed and are negative.  --------------------------------------------- PAST HISTORY ---------------------------------------------  Past Medical History:  has no past medical history on file. Past Surgical History:  has a past surgical history that includes Cholecystectomy (2000) and fracture surgery. Social History:  reports that he has never smoked. He has never used smokeless tobacco. He reports that he does not drink alcohol and does not use drugs. Family History: family history includes Diabetes in his father and mother; Heart Failure in his mother. The patients home medications have been reviewed.     Allergies: Sulfa antibiotics    -------------------------------------------------- RESULTS -------------------------------------------------  All laboratory and radiology results have been personally reviewed by myself   LABS:  Results for orders placed or performed during the hospital encounter of 12/05/22   Strep Screen Group A Throat    Specimen: Throat   Result Value Ref Range    Strep Grp A PCR Negative Negative   RAPID INFLUENZA A/B ANTIGENS    Specimen: Nasopharyngeal   Result Value Ref Range    Influenza A by PCR Not Detected Not Detected    Influenza B by PCR Not Detected Not Detected       RADIOLOGY:  Interpreted by Radiologist.  No orders to display       ------------------------- NURSING NOTES AND VITALS REVIEWED ---------------------------   The nursing notes within the ED encounter and vital signs as below have been reviewed. BP (!) 133/91   Pulse 65   Temp 97.1 °F (36.2 °C) (Temporal)   Resp 16   Ht 6' (1.829 m)   Wt 250 lb (113.4 kg)   SpO2 100%   BMI 33.91 kg/m²   Oxygen Saturation Interpretation: Normal      ---------------------------------------------------PHYSICAL EXAM--------------------------------------    Constitutional/General: Alert and oriented x3, well appearing, non toxic in NAD  Head: NC/AT  Eyes: PERRL, EOMI  Mouth: Oropharynx clear, handling secretions, no trismus  Neck: Supple, full ROM, no meningeal signs  Pulmonary: Lungs clear to auscultation bilaterally, no wheezes, rales, or rhonchi. Not in respiratory distress  Cardiovascular:  Regular rate and rhythm, no murmurs, gallops, or rubs. 2+ distal pulses  Abdomen: Soft, non tender, non distended,   Extremities: Moves all extremities x 4. Warm and well perfused  Skin: warm and dry without rash  Neurologic: GCS 15,  Psych: Normal Affect      ------------------------------ ED COURSE/MEDICAL DECISION MAKING----------------------  Medications - No data to display      Medical Decision Making:    Simple    Counseling: The emergency provider has spoken with the patient and discussed todays results, in addition to providing specific details for the plan of care and counseling regarding the diagnosis and prognosis. Questions are answered at this time and they are agreeable with the plan.      --------------------------------- IMPRESSION AND DISPOSITION ---------------------------------    IMPRESSION  1.  Viral syndrome        DISPOSITION  Disposition: Discharge to home  Patient condition is stable                 Aniya Camarillo MD  12/05/22 1010

## 2022-12-05 NOTE — Clinical Note
Nicki Mike was seen and treated in our emergency department on 12/5/2022. He may return to work on . If you have any questions or concerns, please don't hesitate to call.       Tootie Joseph MD

## 2022-12-05 NOTE — Clinical Note
Sandie Amaya was seen and treated in our emergency department on 12/5/2022. He may return to work on 12/07/2022. If you have any questions or concerns, please don't hesitate to call.       Melissa Yang MD

## 2022-12-05 NOTE — Clinical Note
Asad Saleh was seen and treated in our emergency department on 12/5/2022. He may return to work on . If you have any questions or concerns, please don't hesitate to call.       Lana Mckee MD

## 2022-12-05 NOTE — Clinical Note
Amparo Huizar was seen and treated in our emergency department on 12/5/2022. He may return to work on 12/07/2022. If you have any questions or concerns, please don't hesitate to call.       Mary Brown MD

## 2023-01-16 ENCOUNTER — COMMUNITY OUTREACH (OUTPATIENT)
Dept: FAMILY MEDICINE CLINIC | Age: 53
End: 2023-01-16

## 2023-08-05 ENCOUNTER — HOSPITAL ENCOUNTER (EMERGENCY)
Age: 53
Discharge: HOME | End: 2023-08-05
Attending: EMERGENCY MEDICINE
Payer: COMMERCIAL

## 2023-08-05 VITALS
OXYGEN SATURATION: 99 % | DIASTOLIC BLOOD PRESSURE: 79 MMHG | RESPIRATION RATE: 16 BRPM | HEART RATE: 64 BPM | HEIGHT: 72 IN | SYSTOLIC BLOOD PRESSURE: 126 MMHG | BODY MASS INDEX: 30.48 KG/M2 | WEIGHT: 225 LBS | TEMPERATURE: 98.8 F

## 2023-08-05 DIAGNOSIS — L25.9 CONTACT DERMATITIS, UNSPECIFIED CONTACT DERMATITIS TYPE, UNSPECIFIED TRIGGER: Primary | ICD-10-CM

## 2023-08-05 PROCEDURE — 6360000002 HC RX W HCPCS: Performed by: EMERGENCY MEDICINE

## 2023-08-05 PROCEDURE — 96372 THER/PROPH/DIAG INJ SC/IM: CPT

## 2023-08-05 PROCEDURE — 99284 EMERGENCY DEPT VISIT MOD MDM: CPT

## 2023-08-05 RX ORDER — DEXAMETHASONE SODIUM PHOSPHATE 10 MG/ML
10 INJECTION, SOLUTION INTRAMUSCULAR; INTRAVENOUS ONCE
Status: COMPLETED | OUTPATIENT
Start: 2023-08-05 | End: 2023-08-05

## 2023-08-05 RX ADMIN — DEXAMETHASONE SODIUM PHOSPHATE 10 MG: 10 INJECTION, SOLUTION INTRAMUSCULAR; INTRAVENOUS at 08:32

## 2023-08-19 ENCOUNTER — HOSPITAL ENCOUNTER (EMERGENCY)
Age: 53
Discharge: HOME | End: 2023-08-19
Attending: FAMILY MEDICINE
Payer: COMMERCIAL

## 2023-08-19 VITALS
RESPIRATION RATE: 16 BRPM | HEART RATE: 67 BPM | DIASTOLIC BLOOD PRESSURE: 86 MMHG | OXYGEN SATURATION: 97 % | BODY MASS INDEX: 30.48 KG/M2 | WEIGHT: 225 LBS | HEIGHT: 72 IN | SYSTOLIC BLOOD PRESSURE: 152 MMHG | TEMPERATURE: 97.4 F

## 2023-08-19 DIAGNOSIS — U07.1 COVID-19 VIRUS INFECTION: Primary | ICD-10-CM

## 2023-08-19 PROCEDURE — 99282 EMERGENCY DEPT VISIT SF MDM: CPT
